# Patient Record
Sex: FEMALE | URBAN - METROPOLITAN AREA
[De-identification: names, ages, dates, MRNs, and addresses within clinical notes are randomized per-mention and may not be internally consistent; named-entity substitution may affect disease eponyms.]

---

## 2017-02-21 ENCOUNTER — OFFICE VISIT (OUTPATIENT)
Dept: INTERNAL MEDICINE CLINIC | Age: 27
End: 2017-02-21

## 2017-02-21 VITALS
SYSTOLIC BLOOD PRESSURE: 139 MMHG | WEIGHT: 132.2 LBS | HEIGHT: 62 IN | BODY MASS INDEX: 24.33 KG/M2 | TEMPERATURE: 97.9 F | HEART RATE: 89 BPM | OXYGEN SATURATION: 99 % | DIASTOLIC BLOOD PRESSURE: 89 MMHG

## 2017-02-21 DIAGNOSIS — F32.A DEPRESSION, UNSPECIFIED DEPRESSION TYPE: ICD-10-CM

## 2017-02-21 DIAGNOSIS — Z00.00 LABORATORY EXAMINATION ORDERED AS PART OF A COMPLETE PHYSICAL EXAMINATION: Primary | ICD-10-CM

## 2017-02-21 RX ORDER — BUPROPION HYDROCHLORIDE 300 MG/1
300 TABLET ORAL
Qty: 60 TAB | Refills: 3 | Status: SHIPPED | OUTPATIENT
Start: 2017-02-21

## 2017-02-21 RX ORDER — BUPROPION HYDROCHLORIDE 300 MG/1
300 TABLET ORAL
COMMUNITY
End: 2017-02-21 | Stop reason: SDUPTHER

## 2017-02-21 NOTE — PROGRESS NOTES
Ms. Derian Hamlin is a 31y.o. year old female who had concerns including Establish Care; Elevated Blood Pressure; and Medication Evaluation. HPI:  Chief Complaint   Patient presents with    Establish Care    Elevated Blood Pressure    Medication Evaluation     dicuss depression medication-  Panic attacks in the past, on treatment x 2013. celexa caused weight gain   She is a pharmacy student. Prior counseling 6mo - 1 year. Past Medical History:   Diagnosis Date    Depression      Current Outpatient Prescriptions   Medication Sig Dispense    buPROPion XL (WELLBUTRIN XL) 300 mg XL tablet Take 1 Tab by mouth every morning. 60 Tab     No current facility-administered medications for this visit. Reviewed PmHx, RxHx, FmHx, SocHx, AllgHx and updated and dated in the chart. ROS: Negative except for BOLD  General: fever, chills, fatigue  Respiratory: cough, SOB, wheezing  Cardiovascular:  CP, palpitation, HUFF, edema   Gastrointestinal: N/V/D, bleeding  Genito-Urinary: dysuria, hematuria   Musculoskeletal: muscle weakness, pain, swelling    OBJECTIVE:   Visit Vitals    /89 (BP 1 Location: Left arm, BP Patient Position: Sitting)    Pulse 89    Temp 97.9 °F (36.6 °C) (Oral)    Ht 5' 2\" (1.575 m)    Wt 132 lb 3.2 oz (60 kg)    SpO2 99%    BMI 24.18 kg/m2     GEN: The patient appears well, alert, oriented x 3, in no distress. ENT: bilateral TM and canal normal.  Neck supple. No adenopathy or thyromegaly. BUTCH. Lungs: clear bilaterally, good air entry, no wheezes, rhonchi or rales. Cardiovascular: regular rate and rhythm. S1 and S2 normal, no murmurs,  Abdomen: + BS, soft without tenderness, guarding, rebound, mass or organomegaly. Extremities: no edema, normal peripheral pulses. Neurological: normal, gross sensory and motor in tact without focal findings.       Assessment/ Plan:       ICD-10-CM ICD-9-CM    1. Laboratory examination ordered as part of a complete physical examination Z00.00 V72.62 buPROPion XL (WELLBUTRIN XL) 300 mg XL tablet      KS HANDLG&/OR CONVEY OF SPEC FOR TR OFFICE TO LAB      KS COLLECTION VENOUS BLOOD,VENIPUNCTURE      CBC WITH AUTOMATED DIFF      METABOLIC PANEL, BASIC      LIPID PANEL      VITAMIN D, 25 HYDROXY      TSH REFLEX TO T4      CANCELED: CBC WITH AUTOMATED DIFF      CANCELED: LIPID PANEL      CANCELED: METABOLIC PANEL, COMPREHENSIVE      CANCELED: VITAMIN D, 25 HYDROXY      CANCELED: TSH REFLEX TO T4   2. Depression, unspecified depression type F32.9 311 buPROPion XL (WELLBUTRIN XL) 300 mg XL tablet      KS HANDLG&/OR CONVEY OF SPEC FOR TR OFFICE TO LAB      KS COLLECTION VENOUS BLOOD,VENIPUNCTURE      CBC WITH AUTOMATED DIFF      METABOLIC PANEL, BASIC      LIPID PANEL      VITAMIN D, 25 HYDROXY      TSH REFLEX TO T4      DISCONTINUED: buPROPion XL (WELLBUTRIN XL) 300 mg XL tablet      CANCELED: TSH REFLEX TO T4           I have discussed the diagnosis with the patient and the intended plan as seen in the above orders. The patient has received an after-visit summary and questions were answered concerning future plans. Medication Side Effects and Warnings were discussed with patient.     Follow-up Disposition: Not on R Lamar Singh MD

## 2017-02-21 NOTE — MR AVS SNAPSHOT
Visit Information Date & Time Provider Department Dept. Phone Encounter #  
 2/21/2017 11:00 AM Bri Elena MD [facility] and [address] [place] [address] [phone] [id number] Your Appointments 3/2/2017  8:15 AM  
Any with Bri Elena MD  
[address] [place] [address] and Primary Care [facility]) Appt Note: well women check [address] [place]  
  
   
 [id number] Upcoming Health Maintenance Date Due DTaP/Tdap/Td series (1 - Tdap) 7/28/2010 PAP AKA CERVICAL CYTOLOGY 7/28/2010 Allergies as of 2/21/2017  Review Complete On: 2/21/2017 By: Hayley Murry LPN No Known Allergies Current Immunizations  Never Reviewed Name Date Influenza Vaccine 10/27/2016 Not reviewed this visit You Were Diagnosed With   
  
 Codes Comments Laboratory examination ordered as part of a complete physical examination    -  Primary ICD-10-CM: Z00.00 ICD-9-CM: V72.62 Depression, unspecified depression type     ICD-10-CM: F32.9 ICD-9-CM: 301 Vitals BP Pulse Temp Height(growth percentile) Weight(growth percentile) SpO2  
 139/89 (BP 1 Location: Left arm, BP Patient Position: Sitting) 89 97.9 °F (36.6 °C) (Oral) 5' 2\" (1.575 m) 132 lb 3.2 oz (60 kg) 99% BMI OB Status Smoking Status 24.18 kg/m2 IUD Never Smoker Vitals History BMI and BSA Data Body Mass Index Body Surface Area  
 24.18 kg/m 2 1.62 m 2 Preferred Pharmacy Pharmacy Name Phone [id number] [place], [address] [facility]. [phone] Your Updated Medication List  
  
   
This list is accurate as of: 2/21/17  1:33 PM.  Always use your most recent med list.  
  
  
  
  
 buPROPion  mg XL tablet Commonly known as:  Sorin Crew Take 1 Tab by mouth every morning. Prescriptions Sent to Pharmacy Refills buPROPion XL (WELLBUTRIN XL) 300 mg XL tablet 3 Sig: Take 1 Tab by mouth every morning. Class: Normal  
 Pharmacy: [address] [place] [address], [address] [facility].  #: [phone] Route: Oral  
  
We Performed the Following CBC WITH AUTOMATED DIFF [81799 CPT(R)] LIPID PANEL [54277 CPT(R)] METABOLIC PANEL, COMPREHENSIVE [71709 CPT(R)] CA COLLECTION VENOUS BLOOD,VENIPUNCTURE [id number] CPT(R)] CA HANDLG&/OR CONVEY OF SPEC FOR TR OFFICE TO LAB [00033 CPT(R)] TSH REFLEX TO T4 [[id number] Custom] Comments:  
 Please add T3 if TSH is abnormal  
 VITAMIN D, 25 HYDROXY [02005 CPT(R)] Introducing [facility] SERVICES! Alison Bustillos introduces [employer] patient portal. Now you can access parts of your medical record, email your doctor's office, and request medication refills online. 1. In your internet browser, go to [email or web address]. [employer] 2. Click on the First Time User? Click Here link in the Sign In box. You will see the New Member Sign Up page. 3. Enter your [employer] Access Code exactly as it appears below. You will not need to use this code after youve completed the sign-up process. If you do not sign up before the expiration date, you must request a new code. · [employer] Access Code: [email or web address] Expires: 5/22/2017  1:32 PM 
 
4. Enter the last four digits of your Social Security Number (xxxx) and Date of Birth (mm/dd/yyyy) as indicated and click Submit. You will be taken to the next sign-up page. 5. Create a [employer] ID. This will be your [employer] login ID and cannot be changed, so think of one that is secure and easy to remember. 6. Create a [employer] password. You can change your password at any time. 7. Enter your Password Reset Question and Answer. This can be used at a later time if you forget your password. 8. Enter your e-mail address. You will receive e-mail notification when new information is available in [address] 9. Click Sign Up. You can now view and download portions of your medical record. 10. Click the Download Summary menu link to download a portable copy of your medical information. If you have questions, please visit the Frequently Asked Questions section of the [employer] website. Remember, [employer] is NOT to be used for urgent needs. For medical emergencies, dial 911. Now available from your iPhone and Android! Please provide this summary of care documentation to your next provider. Your primary care clinician is listed as Karen Nova. If you have any questions after today's visit, please call [phone].